# Patient Record
Sex: MALE | Race: WHITE | NOT HISPANIC OR LATINO | URBAN - METROPOLITAN AREA
[De-identification: names, ages, dates, MRNs, and addresses within clinical notes are randomized per-mention and may not be internally consistent; named-entity substitution may affect disease eponyms.]

---

## 2020-08-31 ENCOUNTER — OFFICE VISIT (OUTPATIENT)
Dept: URBAN - METROPOLITAN AREA CLINIC 17 | Facility: CLINIC | Age: 75
End: 2020-08-31
Payer: MEDICARE

## 2020-08-31 DIAGNOSIS — H26.493 OTHER SECONDARY CATARACT, BILATERAL: Primary | ICD-10-CM

## 2020-08-31 PROCEDURE — 99203 OFFICE O/P NEW LOW 30 MIN: CPT | Performed by: OPHTHALMOLOGY

## 2020-08-31 ASSESSMENT — VISUAL ACUITY
OS: 20/40
OD: 20/20

## 2020-08-31 ASSESSMENT — INTRAOCULAR PRESSURE
OS: 12
OD: 12

## 2020-08-31 NOTE — IMPRESSION/PLAN
Impression: Other secondary cataract, bilateral: H26.493. Condition: established, worsening. Plan: Discussed diagnosis with patient. Recommend YAG OS laser treatment. Patient understands and wishes to proceed. No laser recommended for OD at this time.

## 2020-09-10 ENCOUNTER — SURGERY (OUTPATIENT)
Dept: URBAN - METROPOLITAN AREA SURGERY 7 | Facility: SURGERY | Age: 75
End: 2020-09-10
Payer: MEDICARE

## 2020-09-10 PROCEDURE — 66821 AFTER CATARACT LASER SURGERY: CPT | Performed by: OPHTHALMOLOGY

## 2020-09-17 ENCOUNTER — POST-OPERATIVE VISIT (OUTPATIENT)
Dept: URBAN - METROPOLITAN AREA CLINIC 17 | Facility: CLINIC | Age: 75
End: 2020-09-17
Payer: MEDICARE

## 2020-09-17 DIAGNOSIS — Z48.810 ENCOUNTER FOR SURGICAL AFTERCARE FOLLOWING SURGERY ON A SENSE ORGAN: Primary | ICD-10-CM

## 2020-09-17 PROCEDURE — 99024 POSTOP FOLLOW-UP VISIT: CPT | Performed by: OPTOMETRIST

## 2020-09-17 ASSESSMENT — VISUAL ACUITY: OS: 20/30

## 2020-09-17 ASSESSMENT — INTRAOCULAR PRESSURE
OS: 11
OD: 12

## 2020-09-17 NOTE — IMPRESSION/PLAN
Impression: S/P YAG PC OS - 7 Days. Encounter for surgical aftercare following surgery on a sense organ  Z48.810. Plan: 1 year complete --Advised patient to use artificial tears for comfort.

## 2021-11-02 ENCOUNTER — OFFICE VISIT (OUTPATIENT)
Dept: URBAN - METROPOLITAN AREA CLINIC 17 | Facility: CLINIC | Age: 76
End: 2021-11-02
Payer: MEDICARE

## 2021-11-02 DIAGNOSIS — H43.813 VITREOUS DEGENERATION, BILATERAL: ICD-10-CM

## 2021-11-02 DIAGNOSIS — H26.491 OTHER SECONDARY CATARACT OF RIGHT EYE: Primary | ICD-10-CM

## 2021-11-02 DIAGNOSIS — H40.013 OPEN ANGLE WITH BORDERLINE FINDINGS, LOW RISK, BILATERAL: ICD-10-CM

## 2021-11-02 DIAGNOSIS — H04.123 DRY EYE SYNDROME OF BILATERAL LACRIMAL GLANDS: ICD-10-CM

## 2021-11-02 PROCEDURE — 92133 CPTRZD OPH DX IMG PST SGM ON: CPT | Performed by: OPTOMETRIST

## 2021-11-02 PROCEDURE — 92014 COMPRE OPH EXAM EST PT 1/>: CPT | Performed by: OPTOMETRIST

## 2021-11-02 ASSESSMENT — INTRAOCULAR PRESSURE
OD: 11
OS: 12

## 2021-11-02 NOTE — IMPRESSION/PLAN
Impression: Open angle with borderline findings, low risk, bilateral: H40.013. Plan: Recommend glaucoma workup OU. Ordered ON/RNFL OCT and pachymetry today. If results abnormal, order 24-2 VF within 1-2 weeks. Recall/further testing pending OCT results today.  RNFL OCT: Large ONH OU

## 2022-11-02 ENCOUNTER — OFFICE VISIT (OUTPATIENT)
Dept: URBAN - METROPOLITAN AREA CLINIC 17 | Facility: CLINIC | Age: 77
End: 2022-11-02
Payer: MEDICARE

## 2022-11-02 DIAGNOSIS — H26.491 OTHER SECONDARY CATARACT OF RIGHT EYE: ICD-10-CM

## 2022-11-02 DIAGNOSIS — H43.813 VITREOUS DEGENERATION, BILATERAL: ICD-10-CM

## 2022-11-02 DIAGNOSIS — H04.123 DRY EYE SYNDROME OF BILATERAL LACRIMAL GLANDS: Primary | ICD-10-CM

## 2022-11-02 DIAGNOSIS — B88.0 DERMATITIS DUE TO DEMODEX SPECIES: ICD-10-CM

## 2022-11-02 DIAGNOSIS — Z96.1 PRESENCE OF INTRAOCULAR LENS: ICD-10-CM

## 2022-11-02 PROCEDURE — 92014 COMPRE OPH EXAM EST PT 1/>: CPT | Performed by: OPTOMETRIST

## 2022-11-02 ASSESSMENT — INTRAOCULAR PRESSURE
OS: 13
OS: 14
OD: 13

## 2022-11-02 NOTE — IMPRESSION/PLAN
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 Plan: Discussed diagnosis in detail with patient. Discussed daily lid hygiene. Recommend patient use tea tree oil to wash eyelids. Will continue to observe.

## 2023-11-02 ENCOUNTER — OFFICE VISIT (OUTPATIENT)
Dept: URBAN - METROPOLITAN AREA CLINIC 17 | Facility: CLINIC | Age: 78
End: 2023-11-02
Payer: MEDICARE

## 2023-11-02 DIAGNOSIS — H43.813 VITREOUS DEGENERATION, BILATERAL: ICD-10-CM

## 2023-11-02 DIAGNOSIS — Z96.1 PRESENCE OF PSEUDOPHAKIA: ICD-10-CM

## 2023-11-02 DIAGNOSIS — H04.123 DRY EYE SYNDROME OF BILATERAL LACRIMAL GLANDS: Primary | ICD-10-CM

## 2023-11-02 DIAGNOSIS — H17.89 OTHER CORNEAL SCAR: ICD-10-CM

## 2023-11-02 DIAGNOSIS — H26.491 OTHER SECONDARY CATARACT OF RIGHT EYE: ICD-10-CM

## 2023-11-02 PROCEDURE — 92014 COMPRE OPH EXAM EST PT 1/>: CPT | Performed by: OPTOMETRIST

## 2023-11-02 ASSESSMENT — INTRAOCULAR PRESSURE
OS: 14
OD: 14

## 2024-11-04 ENCOUNTER — OFFICE VISIT (OUTPATIENT)
Dept: URBAN - METROPOLITAN AREA CLINIC 17 | Facility: CLINIC | Age: 79
End: 2024-11-04
Payer: MEDICARE

## 2024-11-04 DIAGNOSIS — H17.89 OTHER CORNEAL SCAR: ICD-10-CM

## 2024-11-04 DIAGNOSIS — H40.1234 LOW-TENSION GLAUCOMA, BILATERAL, INDETERMINATE STAGE: ICD-10-CM

## 2024-11-04 DIAGNOSIS — H04.123 DRY EYE SYNDROME OF BILATERAL LACRIMAL GLANDS: ICD-10-CM

## 2024-11-04 DIAGNOSIS — Z96.1 PRESENCE OF PSEUDOPHAKIA: ICD-10-CM

## 2024-11-04 DIAGNOSIS — H26.491 OTHER SECONDARY CATARACT OF RIGHT EYE: Primary | ICD-10-CM

## 2024-11-04 PROCEDURE — 92133 CPTRZD OPH DX IMG PST SGM ON: CPT | Performed by: OPTOMETRIST

## 2024-11-04 PROCEDURE — 76514 ECHO EXAM OF EYE THICKNESS: CPT | Performed by: OPTOMETRIST

## 2024-11-04 PROCEDURE — 99214 OFFICE O/P EST MOD 30 MIN: CPT | Performed by: OPTOMETRIST

## 2024-11-04 RX ORDER — BRIMONIDINE TARTRATE 2 MG/ML
0.2 % SOLUTION/ DROPS OPHTHALMIC
Qty: 5 | Refills: 6 | Status: ACTIVE
Start: 2024-11-04

## 2024-11-04 ASSESSMENT — INTRAOCULAR PRESSURE
OD: 12
OS: 14

## 2024-11-25 ENCOUNTER — OFFICE VISIT (OUTPATIENT)
Dept: URBAN - METROPOLITAN AREA CLINIC 17 | Facility: CLINIC | Age: 79
End: 2024-11-25
Payer: MEDICARE

## 2024-11-25 DIAGNOSIS — H40.1232 LOW-TENSION GLAUCOMA, BILATERAL, MODERATE STAGE: Primary | ICD-10-CM

## 2024-11-25 PROCEDURE — 99213 OFFICE O/P EST LOW 20 MIN: CPT | Performed by: OPTOMETRIST

## 2024-11-25 PROCEDURE — 92083 EXTENDED VISUAL FIELD XM: CPT | Performed by: OPTOMETRIST

## 2024-11-25 ASSESSMENT — INTRAOCULAR PRESSURE
OS: 14
OD: 12
OD: 13
OS: 12

## 2025-03-25 ENCOUNTER — OFFICE VISIT (OUTPATIENT)
Dept: URBAN - METROPOLITAN AREA CLINIC 17 | Facility: CLINIC | Age: 80
End: 2025-03-25
Payer: MEDICARE

## 2025-03-25 DIAGNOSIS — H26.491 OTHER SECONDARY CATARACT OF RIGHT EYE: ICD-10-CM

## 2025-03-25 DIAGNOSIS — H40.1232 LOW-TENSION GLAUCOMA, BILATERAL, MODERATE STAGE: Primary | ICD-10-CM

## 2025-03-25 PROCEDURE — 99213 OFFICE O/P EST LOW 20 MIN: CPT | Performed by: OPTOMETRIST

## 2025-03-25 RX ORDER — BRIMONIDINE TARTRATE 2 MG/ML
0.2 % SOLUTION/ DROPS OPHTHALMIC
Qty: 5 | Refills: 6 | Status: ACTIVE
Start: 2025-03-25

## 2025-03-25 ASSESSMENT — VISUAL ACUITY: OS: 20/50

## 2025-03-25 ASSESSMENT — INTRAOCULAR PRESSURE
OD: 13
OS: 14

## 2025-04-24 ENCOUNTER — SURGERY (OUTPATIENT)
Dept: URBAN - METROPOLITAN AREA SURGERY 7 | Facility: SURGERY | Age: 80
End: 2025-04-24
Payer: MEDICARE

## 2025-04-24 PROCEDURE — 66821 AFTER CATARACT LASER SURGERY: CPT | Performed by: OPHTHALMOLOGY

## 2025-07-25 ENCOUNTER — OFFICE VISIT (OUTPATIENT)
Dept: URBAN - METROPOLITAN AREA CLINIC 17 | Facility: CLINIC | Age: 80
End: 2025-07-25
Payer: MEDICARE

## 2025-07-25 DIAGNOSIS — H16.222 KERATOCONJUNCT SICCA, NOT SPECIFIED AS SJOGREN'S, LEFT EYE: ICD-10-CM

## 2025-07-25 DIAGNOSIS — H40.1232 LOW-TENSION GLAUCOMA, BILATERAL, MODERATE STAGE: Primary | ICD-10-CM

## 2025-07-25 PROCEDURE — 99214 OFFICE O/P EST MOD 30 MIN: CPT | Performed by: OPTOMETRIST

## 2025-07-25 RX ORDER — LATANOPROST 50 UG/ML
0.005 % SOLUTION OPHTHALMIC
Qty: 2.5 | Refills: 6 | Status: ACTIVE
Start: 2025-07-25

## 2025-07-25 ASSESSMENT — INTRAOCULAR PRESSURE
OD: 13
OS: 15

## 2025-08-11 ENCOUNTER — OFFICE VISIT (OUTPATIENT)
Dept: URBAN - METROPOLITAN AREA CLINIC 17 | Facility: CLINIC | Age: 80
End: 2025-08-11
Payer: MEDICARE

## 2025-08-11 DIAGNOSIS — H04.123 DRY EYE SYNDROME OF BILATERAL LACRIMAL GLANDS: ICD-10-CM

## 2025-08-11 DIAGNOSIS — H40.1232 LOW-TENSION GLAUCOMA, BILATERAL, MODERATE STAGE: Primary | ICD-10-CM

## 2025-08-11 PROCEDURE — 99213 OFFICE O/P EST LOW 20 MIN: CPT | Performed by: OPTOMETRIST

## 2025-08-11 ASSESSMENT — INTRAOCULAR PRESSURE
OD: 12
OS: 13